# Patient Record
(demographics unavailable — no encounter records)

---

## 2024-10-23 NOTE — ASSESSMENT
[FreeTextEntry1] : explained these dx's and plans 1) chest pain see pulmonologist - staff gave her contact info for Dr Evans 2) Hurts on throat and sides of neck - she is already taking doxycyclyine - likely tmj -soft diet, see dentist; cultured will contact when results are available 3) ears hurt pressure on face - tmj 4) then c/o runny nose few weeks - saline 5) reflux laryngitis - diet [facility] pepcid given diet printout 6) still c/o photophobia, headache and bronchitis -rec she go to e.r; she then stated she thought this might be from anxiety and panic and will decide if she goes. I was hoping they could also address that in ER. 7) vertigo - vng and mri ordered. She said she had mri last yr but could not remember where and wants a new one rtc with vng and mri reassured her hearing is nl

## 2024-10-23 NOTE — HISTORY OF PRESENT ILLNESS
[de-identified] : 23 yo f with numerous complaints. 1/2024 had intestinal parasite and went to [place] to get treated there (she said because treatment is better there). She was on an antibiotic for it. Then had e coli infection. Then diagnosed with ibs. 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 She c/o anxiety, bronchitis, dysphagia, headache, photophobia, odynophagia, otalgia, panic, hearing loss and vertigo. She has been to emergency rooms 6x all different ones and states she is always told there is nothing wrong with her. She states she is experiencing significant anxiety and that her psychiatrist ordered medication for her which she hopes to start today. She is a nonsmoker. No fh relevant to cc.

## 2024-10-23 NOTE — PROCEDURE
[Dysphagia] : dysphagia not clearly evaluated by indirect laryngoscopy [Topical Lidocaine] : topical lidocaine [Oxymetazoline HCl] : oxymetazoline HCl [Flexible Endoscope] : examined with the flexible endoscope [Serial Number: ___] : Serial Number: [unfilled] [Normal] : the false vocal folds were pink and regular, the ventricular sulcus was open, the true vocal folds were glistening white, tense and of equal length, mobility, and height [Interarytenoid Edema] : interarytenoid area edematous [de-identified] : done for dysphagia mild iah cw rl

## 2024-10-23 NOTE — ASSESSMENT
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 I was hoping they could also address that in ER. 7) vertigo - vng and mri ordered. She said she had mri last yr but could not remember where and wants a new one rtc with vng and mri reassured her hearing is nl

## 2024-10-23 NOTE — PHYSICAL EXAM
[de-identified] : b mild  [Midline] : trachea located in midline position [Laryngoscopy Performed] : laryngoscopy was performed, see procedure section for findings [Normal] : no rashes [de-identified] : gait steady, does not appear uncofortable or ill. Not photophobic despite complaint.

## 2024-10-23 NOTE — HISTORY OF PRESENT ILLNESS
[de-identified] : 21 yo f with numerous complaints. 1/2024 had intestinal parasite and went to [place] to get treated there (she said because treatment is better there). She was on an antibiotic for it. Then had e coli infection. Then diagnosed with ibs. 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 She c/o anxiety, bronchitis, dysphagia, headache, photophobia, odynophagia, otalgia, panic, hearing loss and vertigo. She has been to emergency rooms 6x all different ones and states she is always told there is nothing wrong with her. She states she is experiencing significant anxiety and that her psychiatrist ordered medication for her which she hopes to start today. She is a nonsmoker. No fh relevant to cc.

## 2024-10-23 NOTE — PHYSICAL EXAM
[de-identified] : b mild  [Midline] : trachea located in midline position [Laryngoscopy Performed] : laryngoscopy was performed, see procedure section for findings [Normal] : no rashes [de-identified] : gait steady, does not appear uncofortable or ill. Not photophobic despite complaint.

## 2024-10-23 NOTE — PROCEDURE
[Dysphagia] : dysphagia not clearly evaluated by indirect laryngoscopy [Topical Lidocaine] : topical lidocaine [Oxymetazoline HCl] : oxymetazoline HCl [Flexible Endoscope] : examined with the flexible endoscope [Serial Number: ___] : Serial Number: [unfilled] [Normal] : the false vocal folds were pink and regular, the ventricular sulcus was open, the true vocal folds were glistening white, tense and of equal length, mobility, and height [Interarytenoid Edema] : interarytenoid area edematous [de-identified] : done for dysphagia mild iah cw rl